# Patient Record
Sex: FEMALE | Race: BLACK OR AFRICAN AMERICAN | URBAN - METROPOLITAN AREA
[De-identification: names, ages, dates, MRNs, and addresses within clinical notes are randomized per-mention and may not be internally consistent; named-entity substitution may affect disease eponyms.]

---

## 2023-01-30 ENCOUNTER — OFFICE (OUTPATIENT)
Dept: URBAN - METROPOLITAN AREA CLINIC 104 | Facility: CLINIC | Age: 53
Setting detail: OPHTHALMOLOGY
End: 2023-01-30
Payer: COMMERCIAL

## 2023-01-30 DIAGNOSIS — Z96.1: ICD-10-CM

## 2023-01-30 DIAGNOSIS — H40.1132: ICD-10-CM

## 2023-01-30 PROBLEM — H26.493 POSTERIOR CAPSULAR OPACIFICATION; BOTH EYES: Status: ACTIVE | Noted: 2023-01-30

## 2023-01-30 PROCEDURE — 92133 CPTRZD OPH DX IMG PST SGM ON: CPT | Performed by: OPHTHALMOLOGY

## 2023-01-30 PROCEDURE — 99024 POSTOP FOLLOW-UP VISIT: CPT | Performed by: OPHTHALMOLOGY

## 2023-01-30 ASSESSMENT — REFRACTION_AUTOREFRACTION
OS_AXIS: 079
OD_AXIS: 106
OD_SPHERE: +0.25
OS_SPHERE: +2.00
OS_CYLINDER: -2.00
OD_CYLINDER: -0.25

## 2023-01-30 ASSESSMENT — REFRACTION_MANIFEST
OS_VA1: 20/20
OD_AXIS: 70
OD_ADD: +2.00
OS_AXIS: 180
OS_CYLINDER: -0.50
OS_SPHERE: -0.25
OD_SPHERE: PLANO
OD_VA1: 20/20
OD_CYLINDER: -0.50
OS_ADD: +2.00

## 2023-01-30 ASSESSMENT — PACHYMETRY
OD_CT_UM: 559
OS_CT_CORRECTION: -1
OS_CT_UM: 562
OD_CT_CORRECTION: -1

## 2023-01-30 ASSESSMENT — SPHEQUIV_DERIVED
OS_SPHEQUIV: -0.5
OD_SPHEQUIV: 0.125
OS_SPHEQUIV: 1

## 2023-01-30 ASSESSMENT — VISUAL ACUITY
OD_BCVA: 20/25-2
OS_BCVA: 20/30-2

## 2023-01-30 ASSESSMENT — CONFRONTATIONAL VISUAL FIELD TEST (CVF)
OD_FINDINGS: FULL
OS_FINDINGS: FULL

## 2023-01-30 ASSESSMENT — TONOMETRY
OD_IOP_MMHG: 15
OS_IOP_MMHG: 15

## 2023-03-13 ENCOUNTER — RX ONLY (RX ONLY)
Age: 53
End: 2023-03-13

## 2023-03-13 ENCOUNTER — OFFICE (OUTPATIENT)
Dept: URBAN - METROPOLITAN AREA CLINIC 13 | Facility: CLINIC | Age: 53
Setting detail: OPHTHALMOLOGY
End: 2023-03-13
Payer: COMMERCIAL

## 2023-03-13 DIAGNOSIS — H26.491: ICD-10-CM

## 2023-03-13 PROCEDURE — 66821 AFTER CATARACT LASER SURGERY: CPT | Performed by: OPHTHALMOLOGY

## 2023-03-13 ASSESSMENT — SPHEQUIV_DERIVED
OS_SPHEQUIV: 1
OS_SPHEQUIV: -0.5
OD_SPHEQUIV: 0.125

## 2023-03-13 ASSESSMENT — REFRACTION_MANIFEST
OS_AXIS: 180
OS_VA1: 20/20
OD_CYLINDER: -0.50
OD_VA1: 20/20
OS_CYLINDER: -0.50
OS_SPHERE: -0.25
OD_SPHERE: PLANO
OS_ADD: +2.00
OD_AXIS: 70
OD_ADD: +2.00

## 2023-03-13 ASSESSMENT — VISUAL ACUITY
OD_BCVA: 20/30+
OS_BCVA: 20/25

## 2023-03-13 ASSESSMENT — REFRACTION_AUTOREFRACTION
OS_CYLINDER: -2.00
OS_SPHERE: +2.00
OS_AXIS: 079
OD_CYLINDER: -0.25
OD_AXIS: 106
OD_SPHERE: +0.25

## 2023-03-13 ASSESSMENT — CONFRONTATIONAL VISUAL FIELD TEST (CVF)
OS_FINDINGS: FULL
OD_FINDINGS: FULL

## 2023-03-27 ENCOUNTER — OFFICE (OUTPATIENT)
Dept: URBAN - METROPOLITAN AREA CLINIC 13 | Facility: CLINIC | Age: 53
Setting detail: OPHTHALMOLOGY
End: 2023-03-27
Payer: COMMERCIAL

## 2023-03-27 ENCOUNTER — RX ONLY (RX ONLY)
Age: 53
End: 2023-03-27

## 2023-03-27 DIAGNOSIS — H26.492: ICD-10-CM

## 2023-03-27 PROCEDURE — 66821 AFTER CATARACT LASER SURGERY: CPT | Performed by: OPHTHALMOLOGY

## 2023-03-27 ASSESSMENT — REFRACTION_AUTOREFRACTION
OS_AXIS: 079
OS_CYLINDER: -2.00
OS_SPHERE: +2.00
OD_SPHERE: +0.25
OD_CYLINDER: -0.25
OD_AXIS: 106

## 2023-03-27 ASSESSMENT — SPHEQUIV_DERIVED
OD_SPHEQUIV: 0.125
OS_SPHEQUIV: 1
OS_SPHEQUIV: -0.5

## 2023-03-27 ASSESSMENT — REFRACTION_MANIFEST
OS_SPHERE: -0.25
OS_VA1: 20/20
OD_SPHERE: PLANO
OS_CYLINDER: -0.50
OS_AXIS: 180
OS_ADD: +2.00
OD_CYLINDER: -0.50
OD_ADD: +2.00
OD_VA1: 20/20
OD_AXIS: 70

## 2023-03-27 ASSESSMENT — VISUAL ACUITY
OD_BCVA: 20/30+
OS_BCVA: 20/25

## 2023-03-27 ASSESSMENT — CONFRONTATIONAL VISUAL FIELD TEST (CVF)
OS_FINDINGS: FULL
OD_FINDINGS: FULL

## 2023-05-11 ENCOUNTER — RX ONLY (RX ONLY)
Age: 53
End: 2023-05-11

## 2023-05-11 ENCOUNTER — OFFICE (OUTPATIENT)
Dept: URBAN - METROPOLITAN AREA CLINIC 104 | Facility: CLINIC | Age: 53
Setting detail: OPHTHALMOLOGY
End: 2023-05-11
Payer: COMMERCIAL

## 2023-05-11 DIAGNOSIS — H26.493: ICD-10-CM

## 2023-05-11 DIAGNOSIS — H40.1132: ICD-10-CM

## 2023-05-11 PROCEDURE — 99024 POSTOP FOLLOW-UP VISIT: CPT | Performed by: OPHTHALMOLOGY

## 2023-05-11 ASSESSMENT — KERATOMETRY
OS_AXISANGLE_DEGREES: 095
OD_K1POWER_DIOPTERS: 44.88
OS_K2POWER_DIOPTERS: 45.67
OS_K1POWER_DIOPTERS: 45.12
OD_AXISANGLE_DEGREES: 091
OD_K2POWER_DIOPTERS: 45.98

## 2023-05-11 ASSESSMENT — REFRACTION_MANIFEST
OS_SPHERE: -0.25
OD_ADD: +2.00
OD_SPHERE: PLANO
OS_VA1: 20/20
OS_AXIS: 180
OS_ADD: +2.00
OS_CYLINDER: -0.50
OD_AXIS: 70
OD_CYLINDER: -0.50
OD_VA1: 20/20

## 2023-05-11 ASSESSMENT — VISUAL ACUITY
OD_BCVA: 20/20-1
OS_BCVA: 20/20

## 2023-05-11 ASSESSMENT — REFRACTION_CURRENTRX
OD_SPHERE: PLANO
OS_OVR_VA: 20/
OD_AXIS: 074
OD_VPRISM_DIRECTION: PROGS
OS_SPHERE: -0.25
OD_OVR_VA: 20/
OS_VPRISM_DIRECTION: PROGS
OD_ADD: +2.00
OS_CYLINDER: -0.50
OS_ADD: +2.00
OS_AXIS: 177
OD_CYLINDER: -0.50

## 2023-05-11 ASSESSMENT — PACHYMETRY
OD_CT_UM: 559
OD_CT_CORRECTION: -1
OS_CT_CORRECTION: -1
OS_CT_UM: 562

## 2023-05-11 ASSESSMENT — AXIALLENGTH_DERIVED
OS_AL: 22.78
OD_AL: 23
OS_AL: 23.1

## 2023-05-11 ASSESSMENT — REFRACTION_AUTOREFRACTION
OS_SPHERE: +0.50
OD_AXIS: 026
OD_CYLINDER: -1.00
OD_SPHERE: +0.25
OS_AXIS: 027
OS_CYLINDER: -0.25

## 2023-05-11 ASSESSMENT — CONFRONTATIONAL VISUAL FIELD TEST (CVF)
OD_FINDINGS: FULL
OS_FINDINGS: FULL

## 2023-05-11 ASSESSMENT — SPHEQUIV_DERIVED
OD_SPHEQUIV: -0.25
OS_SPHEQUIV: 0.375
OS_SPHEQUIV: -0.5

## 2023-05-11 ASSESSMENT — TONOMETRY
OD_IOP_MMHG: 20
OS_IOP_MMHG: 18

## 2023-10-12 ENCOUNTER — OFFICE (OUTPATIENT)
Dept: URBAN - METROPOLITAN AREA CLINIC 104 | Facility: CLINIC | Age: 53
Setting detail: OPHTHALMOLOGY
End: 2023-10-12
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
## 2023-10-12 PROCEDURE — NO SHOW FE NO SHOW FEE: Performed by: OPHTHALMOLOGY

## 2024-02-24 ENCOUNTER — EMERGENCY (EMERGENCY)
Facility: HOSPITAL | Age: 54
LOS: 0 days | Discharge: HOME | End: 2024-02-24
Attending: EMERGENCY MEDICINE
Payer: COMMERCIAL

## 2024-02-24 VITALS
OXYGEN SATURATION: 99 % | SYSTOLIC BLOOD PRESSURE: 155 MMHG | HEART RATE: 72 BPM | TEMPERATURE: 98 F | DIASTOLIC BLOOD PRESSURE: 75 MMHG | RESPIRATION RATE: 16 BRPM

## 2024-02-24 VITALS
TEMPERATURE: 98 F | WEIGHT: 218.04 LBS | HEART RATE: 76 BPM | OXYGEN SATURATION: 100 % | RESPIRATION RATE: 16 BRPM | SYSTOLIC BLOOD PRESSURE: 172 MMHG | DIASTOLIC BLOOD PRESSURE: 81 MMHG

## 2024-02-24 DIAGNOSIS — I10 ESSENTIAL (PRIMARY) HYPERTENSION: ICD-10-CM

## 2024-02-24 DIAGNOSIS — M25.562 PAIN IN LEFT KNEE: ICD-10-CM

## 2024-02-24 PROCEDURE — 73564 X-RAY EXAM KNEE 4 OR MORE: CPT | Mod: LT

## 2024-02-24 PROCEDURE — 99053 MED SERV 10PM-8AM 24 HR FAC: CPT

## 2024-02-24 PROCEDURE — 99284 EMERGENCY DEPT VISIT MOD MDM: CPT

## 2024-02-24 PROCEDURE — 73564 X-RAY EXAM KNEE 4 OR MORE: CPT | Mod: 26,LT

## 2024-02-24 PROCEDURE — 99283 EMERGENCY DEPT VISIT LOW MDM: CPT | Mod: 25

## 2024-02-24 RX ORDER — ACETAMINOPHEN 500 MG
650 TABLET ORAL ONCE
Refills: 0 | Status: COMPLETED | OUTPATIENT
Start: 2024-02-24 | End: 2024-02-24

## 2024-02-24 RX ADMIN — Medication 650 MILLIGRAM(S): at 08:27
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
## 2024-02-24 NOTE — ED PROVIDER NOTE - PATIENT PORTAL LINK FT
You can access the FollowMyHealth Patient Portal offered by [facility] by registering at the following website: [email or web address]. By joining [facility]’s FollowMyHealth portal, you will also be able to view your health information using other applications (apps) compatible with our system.

## 2024-02-24 NOTE — ED PROVIDER NOTE - PHYSICAL EXAMINATION
Constitutional: NAD AAOx3  Eyes: PERRLA EOMI  Head: Normocephalic atraumatic  ENT: No key sign, no raccoon eyes   Mouth: MMM  Cardiac: regular rate   Resp: unlabored breathing clear b/l   GI: Abd s/nt/nd  Neuro: grossly normal and intact GCS 15 no neuro deficits  Skin: No rashes, no bruising to chest, back, abdomen or extremities  Msk: No midline spinal ttp, full ROM of neck, c-collar cleared clinically and with provocative testing, no ttp of facial bones, no ttp to chest wall, pelvis stable, mild ttp left knee normal pulses and sensation extensor mechanism intact
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
## 2024-02-24 NOTE — ED PROVIDER NOTE - WORK/EXCUSE FORM DATE
PHYSICAL EXAM/REVIEW OF SYSTEMS/PAST MEDICAL/SURGICAL/SOCIAL HISTORY/HIV/DISPOSITION/HISTORY OF PRESENT ILLNESS/VITAL SIGNS( [place]) 26-Feb-2024
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
